# Patient Record
Sex: FEMALE | Race: WHITE
[De-identification: names, ages, dates, MRNs, and addresses within clinical notes are randomized per-mention and may not be internally consistent; named-entity substitution may affect disease eponyms.]

---

## 2017-04-18 ENCOUNTER — HOSPITAL ENCOUNTER (OUTPATIENT)
Dept: HOSPITAL 45 - C.CTS | Age: 42
Discharge: HOME | End: 2017-04-18
Attending: FAMILY MEDICINE
Payer: COMMERCIAL

## 2017-04-18 DIAGNOSIS — N83.201: ICD-10-CM

## 2017-04-18 DIAGNOSIS — R10.9: Primary | ICD-10-CM

## 2017-04-18 LAB
ALBUMIN/GLOB SERPL: 1 {RATIO} (ref 0.9–2)
ALP SERPL-CCNC: 100 U/L (ref 45–117)
ALT SERPL-CCNC: 22 U/L (ref 12–78)
ANION GAP SERPL CALC-SCNC: 8 MMOL/L (ref 3–11)
APPEARANCE UR: CLEAR
AST SERPL-CCNC: 10 U/L (ref 15–37)
BASOPHILS # BLD: 0.02 K/UL (ref 0–0.2)
BASOPHILS NFR BLD: 0.2 %
BILIRUB UR-MCNC: (no result) MG/DL
BUN SERPL-MCNC: 12 MG/DL (ref 7–18)
BUN/CREAT SERPL: 12.6 (ref 10–20)
CALCIUM SERPL-MCNC: 8.8 MG/DL (ref 8.5–10.1)
CHLORIDE SERPL-SCNC: 109 MMOL/L (ref 98–107)
CO2 SERPL-SCNC: 24 MMOL/L (ref 21–32)
COLOR UR: YELLOW
COMPLETE: YES
CREAT SERPL-MCNC: 0.95 MG/DL (ref 0.6–1.2)
EOSINOPHIL NFR BLD AUTO: 264 K/UL (ref 130–400)
GLOBULIN SER-MCNC: 3.7 GM/DL (ref 2.5–4)
GLUCOSE SERPL-MCNC: 79 MG/DL (ref 70–99)
HCT VFR BLD CALC: 43.4 % (ref 37–47)
IG%: 0.3 %
IMM GRANULOCYTES NFR BLD AUTO: 32.5 %
LYMPHOCYTES # BLD: 3.03 K/UL (ref 1.2–3.4)
MANUAL MICROSCOPIC REQUIRED?: NO
MCH RBC QN AUTO: 29.5 PG (ref 25–34)
MCHC RBC AUTO-ENTMCNC: 32.7 G/DL (ref 32–36)
MCV RBC AUTO: 90.2 FL (ref 80–100)
MONOCYTES NFR BLD: 6.1 %
NEUTROPHILS # BLD AUTO: 1.2 %
NEUTROPHILS NFR BLD AUTO: 59.7 %
NITRITE UR QL STRIP: (no result)
PH UR STRIP: 5.5 [PH] (ref 4.5–7.5)
PMV BLD AUTO: 10.2 FL (ref 7.4–10.4)
POTASSIUM SERPL-SCNC: 4.2 MMOL/L (ref 3.5–5.1)
RBC # BLD AUTO: 4.81 M/UL (ref 4.2–5.4)
REVIEW REQ?: NO
SODIUM SERPL-SCNC: 141 MMOL/L (ref 136–145)
SP GR UR STRIP: 1.03 (ref 1–1.03)
URINE EPITHELIAL CELL AUTO: (no result) /LPF (ref 0–5)
UROBILINOGEN UR-MCNC: (no result) MG/DL
WBC # BLD AUTO: 9.32 K/UL (ref 4.8–10.8)

## 2017-04-18 NOTE — DIAGNOSTIC IMAGING REPORT
ABDOMEN AND PELVIS CT WITHOUT CONTRAST



CT DOSE: 674.03 mGy.cm



HISTORY: Pain  nausea



TECHNIQUE: Multiaxial CT images of the abdomen and pelvis were performed without

contrast.



COMPARISON STUDY: 7/13/2015



FINDINGS: Lung bases are clear. Prior cholecystectomy. Liver spleen and pancreas

are unremarkable.



Adrenal glands are normal. Kidneys are negative for calcification or

hydronephrosis.



The appendix is normal. The bowel pattern within the abdomen and pelvis is

considered nonobstructive. There is trace amount of free fluid within the pelvic

cul-de-sac. There is a 2.5 cm hyperdense and a partially hemorrhagic right

ovarian cyst. There again is a trace amount of free fluid within the pelvic

cul-de-sac. Uterus is anteflexed. Bladder is midline. There are no bladder

calcifications.



IMPRESSION: 



1. The appendix is normal.

2. Nonobstructive bowel pattern.





3. 2.5 cm hyperdense and/or partially hemorrhagic right ovarian cyst.

4. Small amount of free fluid within the pelvic cul-de-sac possibly indicative

of a partial cyst rupture. 







Electronically signed by:  Abe Muro M.D.

4/18/2017 12:49 PM



Dictated Date/Time:  4/18/2017 12:46 PM

## 2017-10-12 NOTE — DIAGNOSTIC IMAGING REPORT
CHEST ONE VIEW PORTABLE



CLINICAL HISTORY: Atypical chest pain    



COMPARISON STUDY:  No previous studies for comparison.



FINDINGS: The cardiac and mediastinal contours are normal. There is no evidence

of focal pulmonary consolidation. There is no evidence of failure. No pleural

effusions are visualized.[ 



IMPRESSION: No active disease in the chest.







Electronically signed by:  Smith Morrow M.D.

10/12/2017 12:39 PM



Dictated Date/Time:  10/12/2017 12:39 PM

## 2017-10-12 NOTE — EMERGENCY ROOM VISIT NOTE
History


Report prepared by Tamera:  Tian Juarez


Under the Supervision of:  Dr. Dean Adkins M.D.


First contact with patient:  12:16


Chief Complaint:  CARDIAC ASSESSMENT


Stated Complaint:  CHEST HEAVINESS, HA, SOB





History of Present Illness


The patient is a 41 year old female who presents to the Emergency Room with 

complaints of persistent chest discomfort that started last night. She 

describes the discomfort in her chest as a heaviness and as if someone is 

sitting on her chest. The patient adds that she has had a headache. She says 

that she has had a lack of energy as well and has gotten out of breath easily. 

The patient adds that she had some nausea when the chest pain came on. She 

notes that she had symptoms like this a few years ago but did not have anything 

diagnosed. The patient says that she has had some abdominal pain as well but 

that is nothing new. She notes a history of a cholecystectomy as well as 

pneumonia. The patient says that she has not had any recent stresses recently 

or rigorous activities. She denies any history of blood clots or any family 

history of blood clots. The patient also denies any personal history of lung 

issues (other than pneumonia), heart problems, or reflux. She notes not recent 

long car, plane, or train trips. She also notes no recent sick contacts. Pt 

denies LOC, fevers, chills, diaphoresis, visual changes, neck pain, vomiting, 

new abdominal pain, back pain, melena, hematochezia, urinary symptoms, numbness

, weakness, lymphadenopathy, rash, or other complaints.





   Source of History:  patient


   Onset:  Last night


   Position:  chest


   Quality:  other (heaviness)


   Timing:  other (persistent)


   Associated Symptoms:  + headache, + SOB, + nausea, + fatigue


Note:


No other associated symptoms.





Review of Systems


See HPI for pertinent positives and negatives.  A total of ten systems were 

reviewed and were otherwise negative.





Past Medical & Surgical


Medical Problems:


(1) Migraines


(2) PNA (pneumonia)


Surgical Problems:


(1) History of cholecystectomy








Family History





Gallbladder disease


Hypertension


Stroke





Social History


Smoking Status:  Never Smoker


Alcohol Use:  none


Drug Use:  none


Marital Status:  single


Housing Status:  unknown


Occupation Status:  employed





Current/Historical Medications


Scheduled


Montelukast Sodium (Singulair), 10 MG PO DAILY


Norethindrone (Contraceptive) (Norethindrone), 0.35 MG PO UD


Sertraline HCl (Sertraline HCl), 50 MG PO DAILY





Allergies


Coded Allergies:  


     Erythromycin (Verified  Allergy, Unknown, Swelling of throat, 10/12/17)





Physical Exam


Vital Signs











  Date Time  Temp Pulse Resp B/P (MAP) Pulse Ox O2 Delivery O2 Flow Rate FiO2


 


10/12/17 14:33  63 18 112/71 100   


 


10/12/17 13:34  60 20 113/71 100 Room Air  


 


10/12/17 12:24     98 Room Air  


 


10/12/17 12:23  63      


 


10/12/17 12:23     98 Room Air  


 


10/12/17 12:23     98 Room Air  


 


10/12/17 12:05 36.9 67 18 125/78 98 Room Air  











Physical Exam


GENERAL: Awake, alert, well-appearing, in no distress


HENT: Normocephalic, atraumatic. Oropharynx unremarkable.


EYES: Normal conjunctiva. Sclera non-icteric.


NECK: Supple. No nuchal rigidity. FROM. No JVD.


RESPIRATORY: Clear to auscultation.


CARDIAC: Regular rate, normal rhythm. Extremities warm and well perfused. 

Pulses equal.


ABDOMEN: Soft, non-distended. No tenderness to palpation. No rebound or 

guarding. No masses.


RECTAL: Deferred.


MUSCULOSKELETAL: Chest examination reveals no tenderness. The back is 

symmetrical on inspection without obvious abnormality. There is no CVA 

tenderness to palpation. No joint edema. 


LOWER EXTREMITIES: Calves are equal size bilaterally and non-tender. No edema. 

No discoloration. 


NEURO: Normal sensorium. No sensory or motor deficits noted. 


SKIN: No rash or jaundice noted.





Medical Decision & Procedures


ER Provider


Diagnostic Interpretation:


X-ray: Per my interpretation, radiologist review. 








CHEST ONE VIEW PORTABLE





CLINICAL HISTORY: Atypical chest pain    





COMPARISON STUDY:  No previous studies for comparison.





FINDINGS: The cardiac and mediastinal contours are normal. There is no evidence


of focal pulmonary consolidation. There is no evidence of failure. No pleural


effusions are visualized.[ 





IMPRESSION: No active disease in the chest.








Electronically signed by:  Smith Morrow M.D.


10/12/2017 12:39 PM





Dictated Date/Time:  10/12/2017 12:39 PM





Laboratory Results


10/12/17 12:15








Red Blood Count 4.76, Mean Corpuscular Volume 91.0, Mean Corpuscular Hemoglobin 

31.3, Mean Corpuscular Hemoglobin Concent 34.4, Mean Platelet Volume 9.5, 

Neutrophils (%) (Auto) 63.3, Lymphocytes (%) (Auto) 29.2, Monocytes (%) (Auto) 

5.5, Eosinophils (%) (Auto) 1.3, Basophils (%) (Auto) 0.3, Neutrophils # (Auto) 

6.32, Lymphocytes # (Auto) 2.92, Monocytes # (Auto) 0.55, Eosinophils # (Auto) 

0.13, Basophils # (Auto) 0.03





10/12/17 12:15

















Test


  10/12/17


12:15 10/12/17


12:26


 


White Blood Count


  9.99 K/uL


(4.8-10.8) 


 


 


Red Blood Count


  4.76 M/uL


(4.2-5.4) 


 


 


Hemoglobin


  14.9 g/dL


(12.0-16.0) 


 


 


Hematocrit 43.3 % (37-47)  


 


Mean Corpuscular Volume


  91.0 fL


() 


 


 


Mean Corpuscular Hemoglobin


  31.3 pg


(25-34) 


 


 


Mean Corpuscular Hemoglobin


Concent 34.4 g/dl


(32-36) 


 


 


Platelet Count


  218 K/uL


(130-400) 


 


 


Mean Platelet Volume


  9.5 fL


(7.4-10.4) 


 


 


Neutrophils (%) (Auto) 63.3 %  


 


Lymphocytes (%) (Auto) 29.2 %  


 


Monocytes (%) (Auto) 5.5 %  


 


Eosinophils (%) (Auto) 1.3 %  


 


Basophils (%) (Auto) 0.3 %  


 


Neutrophils # (Auto)


  6.32 K/uL


(1.4-6.5) 


 


 


Lymphocytes # (Auto)


  2.92 K/uL


(1.2-3.4) 


 


 


Monocytes # (Auto)


  0.55 K/uL


(0.11-0.59) 


 


 


Eosinophils # (Auto)


  0.13 K/uL


(0-0.5) 


 


 


Basophils # (Auto)


  0.03 K/uL


(0-0.2) 


 


 


RDW Standard Deviation


  42.3 fL


(36.4-46.3) 


 


 


RDW Coefficient of Variation


  12.8 %


(11.5-14.5) 


 


 


Immature Granulocyte % (Auto) 0.4 %  


 


Immature Granulocyte # (Auto)


  0.04 K/uL


(0.00-0.02) 


 


 


Anion Gap


  8.0 mmol/L


(3-11) 


 


 


Est Creatinine Clear Calc


Drug Dose 96.8 ml/min 


  


 


 


Estimated GFR (


American) 98.6 


  


 


 


Estimated GFR (Non-


American 85.1 


  


 


 


BUN/Creatinine Ratio 9.6 (10-20)  


 


Calcium Level


  8.4 mg/dl


(8.5-10.1) 


 


 


Total Bilirubin


  0.6 mg/dl


(0.2-1) 


 


 


Direct Bilirubin


  0.1 mg/dl


(0-0.2) 


 


 


Aspartate Amino Transf


(AST/SGOT) 14 U/L (15-37) 


  


 


 


Alanine Aminotransferase


(ALT/SGPT) 22 U/L (12-78) 


  


 


 


Alkaline Phosphatase


  97 U/L


() 


 


 


Total Protein


  7.5 gm/dl


(6.4-8.2) 


 


 


Albumin


  3.5 gm/dl


(3.4-5.0) 


 


 


Lipase


  180 U/L


() 


 


 


Human Chorionic Gonadotropin,


Qual NEG (NEG) 


  


 


 


Bedside D-Dimer


  


  365 ng/mlFEU


(0-450)


 


Bedside Troponin I


  


  < 0.030 ng/ml


(0-0.045)





Laboratory results reviewed by me





Medications Administered











 Medications


  (Trade)  Dose


 Ordered  Sig/Brady


 Route  Start Time


 Stop Time Status Last Admin


Dose Admin


 


 Albuterol


  (Ventolin Hfa


 Inhaler)  2 puffs  NOW  ONCE


 INH  10/12/17 14:30


 10/12/17 14:31 DC 10/12/17 14:40


2 PUFFS











ECG


Indication:  chest pain


Rate (beats per minute):  57


Rhythm:  sinus bradycardia


Findings:  Q waves (Septal), no acute ischemic change, no ectopy, other (no 

pericarditis)





ED Course


1257: The patient was evaluated in room C1B. A complete history and physical 

exam was performed.





1405: I reevaluated and updated the patient.





1426: I reevaluated the patient and she is resting comfortably. Discussed 

results and discharge instructions: she verbalized understanding and agreement. 

The patient is ready for discharge. 





1430: Ordered Ventolin Hfa Inhaler 2 puffs INH.





Medical Decision


Triage Nursing notes reviewed.


The patient's presentation and history were concerning for chest pain.








Etiologies such as cardiac ischemia, aortic dissection, pulmonary embolism, 

pneumonia, pneumothorax, musculoskeletal, infections, gastrointestinal, as well 

as others were entertained.  





The patient was evaluated.  Clinically she looked well.  ECG did not reveal any 

acute findings.  Chest x-ray was unremarkable.  Patient had a unremarkable CBC, 

chemistry panel, LFTs and lipase.  D-dimer and troponin were negative.  The 

patient's symptoms have been constant and I would expect the troponin to be 

abnormal this was cardiac in the duration.  The patient was reassessed.  She 

was doing well.  She questioned if this could be respiratory in nature as she 

has just moved into a new residence.  The patient will be tried on an inhaler, 

Ventolin.  She will follow-up closely as an outpatient.  If she worsens in any 

way she will be back.





By the evaluation outlined above other emergent etiologies such as those listed 

in the differential, as well as others, were deemed relatively unlikely.  





The patient was educated about the findings as listed above.  All questions 

were answered and the patient was pleased with the treatment.  Return 

instructions were outlined and the patient was discharged in stable condition.  





The patient was referred to her pcp for follow-up for a recheck of the current 

condition.





Medication Reconcilliation


Current Medication List:  was personally reviewed by me





Blood Pressure Screening


Patient's blood pressure:  Normal blood pressure





Impression





 Primary Impression:  


 Substernal chest pain


 Additional Impression:  


 SOB (shortness of breath)





Scribe Attestation


The scribe's documentation has been prepared under my direction and personally 

reviewed by me in its entirety. I confirm that the note above accurately 

reflects all work, treatment, procedures, and medical decision making performed 

by me.





Departure Information


Dispostion


Home / Self-Care





Referrals


Lianet Gruber PA-C (PCP)





Patient Instructions


[facility]





Additional Instructions





CHEST INSTRUCTIONS:





Albuterol inhaler:  Two puffs 4 times daily for seven days to improve breathing

, then 2 puffs every 4-6 hours as needed. 





Ibuprofen(Motrin, Advil) may be used for fever or pain.  Use 600mg every six 

hours as needed.  Take with food.  Avoid using more than 2400mg in a 24 hour 

period.  Do not use 2400mg per day for more than three consecutive days without 

physician direction.  Prolonged inappropriate use can lead to stomach upset or 

ulcers. 


(AND/OR)


Acetaminophen(Tylenol) may be used for fever or pain.  Use 1000mg every six 

hours as needed.  Avoid using more than 4000mg in a 24 hour period.  





Rest and drink plenty of fluids as tolerated.





Continue current medications.





Avoid strenuous activities and anything that worsens your pain.  Resume normal 

activities once your symptoms resolve.    





Return to the ER immediately for worsening or persistent chest pain, abdominal 

pain, vomiting, fevers, chest pains, difficulty breathing, worsening of your 

condition, or as needed.





Follow up with your primary physician next week for a recheck of your current 

condition.





Problem Qualifiers

## 2018-04-12 NOTE — DIAGNOSTIC IMAGING REPORT
LEFT LOWER EXTREMITY VENOUS DOPPLER



CLINICAL HISTORY: PAIN IN LEFT LOWER LEG, SPRAIN OF UNSPECIFIED LIGAMENT    



COMPARISON STUDY:  No previous studies for comparison. 



TECHNIQUE:  Sonography of the deep venous system of the left lower extremity was

performed. Compression and augmentation were evaluated.



FINDINGS:  The left common femoral, superficial femoral and popliteal veins were

compressible. Augmentation was normal. Flow was shown within the deep calf

vessels.



IMPRESSION: No evidence of deep venous thrombus within the left lower extremity.







Electronically signed by:  Juve Ash M.D.

4/12/2018 5:48 PM



Dictated Date/Time:  4/12/2018 5:47 PM

## 2018-04-26 NOTE — MAMMOGRAPHY REPORT
BILATERAL FIRST EVER DIGITAL SCREENING MAMMOGRAM TOMOSYNTHESIS WITH CAD: 4/25/2018

CLINICAL HISTORY: Routine screening.  Baseline exam.  





TECHNIQUE:  Breast tomosynthesis in addition to standard 2D mammography was performed. Current study 
was also evaluated with a Computer Aided Detection (CAD) system.  



COMPARISON: No prior exams were available for comparison.   



BREAST COMPOSITION:  The tissue of both breasts is almost entirely fatty.  



FINDINGS:  There is a 4 mm lobulated and circumscribed mass with single associated punctate calcifica
tion in the subareolar left breast, for which targeted ultrasound is recommended for further characte
rization.



No other suspicious mass, architectural distortion or cluster of microcalcifications is seen bilatera
lly.  



IMPRESSION:  ACR BI-RADS CATEGORY 0: INCOMPLETE EVALUATION:  NEED ADDITIONAL IMAGING EVALUATION

The 4 mm lobulated and circumscribed mass with single associated punctate calcification subareolar le
ft breast needs additional evaluation.  



The patient will be called to schedule an appointment.  





Approximately 10% of breast cancers are not detected with mammography. A negative mammographic report
 should not delay biopsy if a clinically suggestive mass is present.



Cassi Antony M.D.          

ay/:4/25/2018 15:30:47  



Imaging Technologist: Adrienne CARRILLO(RAPHAEL)(M), [facility]

letter sent: Addl Imaging 0  

BI-RADS Code: ACR BI-RADS Category 0: Incomplete Evaluation:  Need Additional Imaging Evaluation

## 2018-05-02 NOTE — MAMMOGRAPHY REPORT
UNILATERAL LEFT DIGITAL DIAGNOSTIC MAMMOGRAM AND TARGETED LEFT ULTRASOUND: 5/2/2018

CLINICAL HISTORY: Callback from screening mammogram for left breast mass and associated calcification
.  





TECHNIQUE: Spot magnification left CC and ML views were obtained.



COMPARISON: Comparison is made to exam dated:  4/25/2018 mammogram - [facility].   




BREAST COMPOSITION:  The tissue of the left breast is almost entirely fatty.  



FINDINGS: Spot magnification views demonstrate a persistent low-density circumscribed 5 mm mass with 
an internal punctate benign-appearing calcification in the left subareolar breast.  Targeted ultrasou
nd was performed of the left subareolar breast, which shows an oval circumscribed anechoic mass which
 measures 5 x 2 x 4 mm.  An echogenic focus is seen within the mass which corresponds with the mammog
raphic calcification.  The mass is benign and compatible with a cyst.



IMPRESSION:  ACR BI-RADS CATEGORY 2: BENIGN, TARGETED ULTRASOUND ACR BI-RADS CATEGORY 2: BENIGN 

Benign 5 mm cyst in the left subareolar breast, which corresponds with the mammographic mass and asso
ciated calcification.  There is no mammographic or targeted sonographic evidence of malignancy. A 1 y
ear screening mammogram is recommended.  The patient has been verbally notified of the results.  





Approximately 10% of breast cancers are not detected with mammography. A negative mammographic report
 should not delay biopsy if a clinically suggestive mass is present.



Nidhi Tran M.D.          

ah/:5/2/2018 14:45:45  



Imaging Technologist: Isabelle LOVE)(NATHAN), [facility]

letter sent: Normal 1/2  

BI-RADS Code: ACR BI-RADS Category 2: Benign  Ultrasound BI-RADS: ACR BI-RADS Category 2: Benign